# Patient Record
Sex: FEMALE | NOT HISPANIC OR LATINO | URBAN - METROPOLITAN AREA
[De-identification: names, ages, dates, MRNs, and addresses within clinical notes are randomized per-mention and may not be internally consistent; named-entity substitution may affect disease eponyms.]

---

## 2022-11-14 ENCOUNTER — APPOINTMENT (RX ONLY)
Dept: URBAN - METROPOLITAN AREA CLINIC 12 | Facility: CLINIC | Age: 57
Setting detail: DERMATOLOGY
End: 2022-11-14

## 2022-11-14 DIAGNOSIS — N62 HYPERTROPHY OF BREAST: ICD-10-CM

## 2022-11-14 PROCEDURE — ? COUNSELING - MACROMASTIA

## 2022-11-14 PROCEDURE — 99202 OFFICE O/P NEW SF 15 MIN: CPT

## 2022-11-14 PROCEDURE — ? PATIENT SPECIFIC COUNSELING

## 2022-11-14 NOTE — PROCEDURE: PATIENT SPECIFIC COUNSELING
Other (Free Text): Patient presents to clinic today for breast reduction and lift consultation. Patient voices that she has concerns with back and shoulder pain which causes numbness radiating down to her elbow. Patient voices no masses in her breast just calcifications that is not growing. Patient would like to be closer to C cup. \\n\\nDrDonya Monsivais examined patient and reports the following: \\n\\n- Outpatient procedure under general anesthesia \\n- Advised to take approximately 2 weeks off from heavy lifting \\n- Incision will be in the shape of a lollipop. One around areola and one vertical with anchor
Detail Level: Simple

## 2024-03-26 ENCOUNTER — APPOINTMENT (RX ONLY)
Dept: URBAN - METROPOLITAN AREA CLINIC 12 | Facility: CLINIC | Age: 59
Setting detail: DERMATOLOGY
End: 2024-03-26

## 2024-03-26 DIAGNOSIS — N62 HYPERTROPHY OF BREAST: ICD-10-CM

## 2024-03-26 PROCEDURE — ? PATIENT SPECIFIC COUNSELING

## 2024-03-26 PROCEDURE — ? OTHER (COSMETIC)

## 2024-03-26 PROCEDURE — ? COUNSELING - MACROMASTIA

## 2024-03-26 NOTE — PROCEDURE: PATIENT SPECIFIC COUNSELING
Other (Free Text): ***************Retalk from 2022**************\\n\\nPatient presents to clinic today for breast reduction and lift consultation. Patient voices that she has concerns with back and shoulder pain which causes numbness radiating down to her elbow. Patient states she has a lump in her breast that’s “not concerning” as well. Patient would like to be closer to C cup. \\n\\nDr. Loi examined patient and reports the following: \\n- patient is a great candidate for a breast reduction and lift\\n- Outpatient procedure under general anesthesia \\n- Advised to take approximately a week and a half off from heavy lifting and work\\n- Incision will be in the shape of a lollipop. One around areola and one vertical with anchor\\n\\nPatient indicated understanding. Jenelle will reach out.
Detail Level: Simple

## 2024-03-26 NOTE — PROCEDURE: OTHER (COSMETIC)
Price $ (Use Numbers Only, No Text Please.): 125
Detail Level: Zone
Sticky Other (Free Text): Consult fee

## 2024-05-29 ENCOUNTER — APPOINTMENT (RX ONLY)
Dept: URBAN - METROPOLITAN AREA CLINIC 12 | Facility: CLINIC | Age: 59
Setting detail: DERMATOLOGY
End: 2024-05-29

## 2024-05-29 DIAGNOSIS — N62 HYPERTROPHY OF BREAST: ICD-10-CM

## 2024-05-29 PROCEDURE — ? OTHER (COSMETIC)

## 2024-05-29 PROCEDURE — ? PRE-OP WORKLIST

## 2024-05-29 ASSESSMENT — LOCATION DETAILED DESCRIPTION DERM
LOCATION DETAILED: LEFT MEDIAL BREAST 6-7:00 REGION
LOCATION DETAILED: RIGHT MEDIAL BREAST 5-6:00 REGION

## 2024-05-29 ASSESSMENT — LOCATION SIMPLE DESCRIPTION DERM
LOCATION SIMPLE: RIGHT BREAST
LOCATION SIMPLE: LEFT BREAST

## 2024-05-29 ASSESSMENT — LOCATION ZONE DERM: LOCATION ZONE: TRUNK

## 2024-06-03 ENCOUNTER — RX ONLY (OUTPATIENT)
Age: 59
Setting detail: RX ONLY
End: 2024-06-03

## 2024-06-03 RX ORDER — ONDANSETRON 8 MG/1
1 TABLET TABLET, ORALLY DISINTEGRATING ORAL EVERY 12 HOURS
Qty: 8 | Refills: 0 | Status: ERX | COMMUNITY
Start: 2024-06-03

## 2024-06-12 ENCOUNTER — APPOINTMENT (RX ONLY)
Dept: URBAN - METROPOLITAN AREA CLINIC 12 | Facility: CLINIC | Age: 59
Setting detail: DERMATOLOGY
End: 2024-06-12

## 2024-06-12 DIAGNOSIS — Z48.89 ENCOUNTER FOR OTHER SPECIFIED SURGICAL AFTERCARE: ICD-10-CM

## 2024-06-12 PROCEDURE — ? DRAIN REMOVAL

## 2024-06-12 PROCEDURE — 99024 POSTOP FOLLOW-UP VISIT: CPT

## 2024-06-12 PROCEDURE — ? COUNSELING - POST-OP CHECK

## 2024-06-12 PROCEDURE — ? PATIENT SPECIFIC COUNSELING

## 2024-06-12 PROCEDURE — ? POST-OP CHECK

## 2024-06-12 ASSESSMENT — LOCATION ZONE DERM
LOCATION ZONE: ARM
LOCATION ZONE: TRUNK

## 2024-06-12 ASSESSMENT — LOCATION SIMPLE DESCRIPTION DERM
LOCATION SIMPLE: RIGHT BREAST
LOCATION SIMPLE: LEFT UPPER ARM
LOCATION SIMPLE: LEFT BREAST

## 2024-06-12 ASSESSMENT — LOCATION DETAILED DESCRIPTION DERM
LOCATION DETAILED: LEFT MEDIAL BREAST 7-8:00 REGION
LOCATION DETAILED: RIGHT PERIAREOLAR BREAST 10-11:00 REGION
LOCATION DETAILED: RIGHT MEDIAL BREAST 4-5:00 REGION
LOCATION DETAILED: LEFT ANTERIOR MEDIAL PROXIMAL UPPER ARM

## 2024-06-12 NOTE — PROCEDURE: PATIENT SPECIFIC COUNSELING
Detail Level: Simple
Other (Free Text): Patient presents s/p bilateral breast reduction and life on 6/5/24. Patient voiced she is doing well.\\n\\Flakito Monsivais examined the patient and voiced that she is looking good. Dr. Monsivais voiced that patient can shower normal but don’t submerge body in water. Dr. Monsivais advised patient to keep a bandage on the drain incisions, change it morning and night. Dr. Monsivais voiced patient can take Tylenol for pain, no Advil. Dr. Monsivais voiced patient should not put any creams on he incisions, take claritin for itching, and no activity for 1 week or lifting more than 5 lbs. Patient indicated understanding and to follow up in 2 weeks.

## 2024-06-12 NOTE — PROCEDURE: COUNSELING - POST-OP CHECK
Detail Level: Simple
Add Postop Global No-Charge Code (55688)?: yes
Influenza , 2018/10/12 14:38 , [place])

## 2024-06-25 ENCOUNTER — APPOINTMENT (RX ONLY)
Dept: URBAN - METROPOLITAN AREA CLINIC 12 | Facility: CLINIC | Age: 59
Setting detail: DERMATOLOGY
End: 2024-06-25

## 2024-06-25 DIAGNOSIS — Z48.89 ENCOUNTER FOR OTHER SPECIFIED SURGICAL AFTERCARE: ICD-10-CM

## 2024-06-25 PROCEDURE — ? COUNSELING - POST-OP CHECK

## 2024-06-25 PROCEDURE — ? PATIENT SPECIFIC COUNSELING

## 2024-06-25 PROCEDURE — 99024 POSTOP FOLLOW-UP VISIT: CPT

## 2024-06-25 PROCEDURE — ? POST-OP CHECK

## 2024-06-25 ASSESSMENT — LOCATION DETAILED DESCRIPTION DERM
LOCATION DETAILED: LEFT MEDIAL BREAST 7-8:00 REGION
LOCATION DETAILED: RIGHT MEDIAL BREAST 4-5:00 REGION

## 2024-06-25 ASSESSMENT — LOCATION SIMPLE DESCRIPTION DERM
LOCATION SIMPLE: RIGHT BREAST
LOCATION SIMPLE: LEFT BREAST

## 2024-06-25 ASSESSMENT — LOCATION ZONE DERM: LOCATION ZONE: TRUNK

## 2024-06-25 NOTE — PROCEDURE: PATIENT SPECIFIC COUNSELING
Detail Level: Simple
Other (Free Text): Patient presents s/p bilateral breast reduction and life on 6/5/24. Patient voiced she is doing well. Patient states she has started back to work. And is handling work well. Dr. Monsivais evaluated patient and voiced \\n\\n- breast look great. No signs of infection seroma or hematoma \\n- recommended arnica gel on her breast to help with bruising and swelling \\n- follow up in 2 weeks will start taping at that time \\n- patient should avoid submerging her breast in [place]

## 2024-07-22 ENCOUNTER — APPOINTMENT (RX ONLY)
Dept: URBAN - METROPOLITAN AREA CLINIC 12 | Facility: CLINIC | Age: 59
Setting detail: DERMATOLOGY
End: 2024-07-22

## 2024-07-22 DIAGNOSIS — Z48.89 ENCOUNTER FOR OTHER SPECIFIED SURGICAL AFTERCARE: ICD-10-CM

## 2024-07-22 PROCEDURE — 99024 POSTOP FOLLOW-UP VISIT: CPT

## 2024-07-22 PROCEDURE — ? PATIENT SPECIFIC COUNSELING

## 2024-07-22 PROCEDURE — ? COUNSELING - POST-OP CHECK

## 2024-07-22 PROCEDURE — ? POST-OP CHECK

## 2024-07-22 ASSESSMENT — LOCATION DETAILED DESCRIPTION DERM
LOCATION DETAILED: RIGHT MEDIAL BREAST 4-5:00 REGION
LOCATION DETAILED: LEFT MEDIAL BREAST 7-8:00 REGION

## 2024-07-22 ASSESSMENT — LOCATION SIMPLE DESCRIPTION DERM
LOCATION SIMPLE: LEFT BREAST
LOCATION SIMPLE: RIGHT BREAST

## 2024-07-22 ASSESSMENT — LOCATION ZONE DERM: LOCATION ZONE: TRUNK

## 2024-07-22 NOTE — PROCEDURE: PATIENT SPECIFIC COUNSELING
Detail Level: Simple
Other (Free Text): Patient presents s/p bilateral breast reduction and life on 6/5/24. Patient voiced she is doing well. Patient states she has a red scab next to the incision on her right breast. Patient states 5he scab was bigger, painful, and more red.\\n\\nDrDonya Monsivais evaluated patient and voiced:\\n\\n- breast look great\\n- the red area was most likely just a scab, nothing to be concerned about\\n- apply arnica gel to the inner sides of the breast to help with swelling\\n- being taping the incisions with scar fading tape and change it when it gets dirty\\n- sleep as normal\\n- wait one more month before wearing wired bras\\n- very good candidate for jawline fillers, referred to [facility]\\nRTC in 2 months

## 2024-09-16 ENCOUNTER — APPOINTMENT (RX ONLY)
Dept: URBAN - METROPOLITAN AREA CLINIC 12 | Facility: CLINIC | Age: 59
Setting detail: DERMATOLOGY
End: 2024-09-16

## 2024-09-16 DIAGNOSIS — Z48.89 ENCOUNTER FOR OTHER SPECIFIED SURGICAL AFTERCARE: ICD-10-CM

## 2024-09-16 PROCEDURE — ? COUNSELING - POST-OP CHECK

## 2024-09-16 PROCEDURE — 99024 POSTOP FOLLOW-UP VISIT: CPT

## 2024-09-16 PROCEDURE — ? POST-OP CHECK

## 2024-09-16 PROCEDURE — ? PATIENT SPECIFIC COUNSELING

## 2024-09-16 ASSESSMENT — LOCATION SIMPLE DESCRIPTION DERM
LOCATION SIMPLE: LEFT BREAST
LOCATION SIMPLE: RIGHT BREAST

## 2024-09-16 ASSESSMENT — LOCATION ZONE DERM: LOCATION ZONE: TRUNK

## 2024-09-16 NOTE — PROCEDURE: PATIENT SPECIFIC COUNSELING
Detail Level: Simple
Other (Free Text): Patient presents s/p bilateral breast reduction and life on 6/5/24. Patient voiced she is doing well, patient states her breast seems  to be sagging a little bit breast are not as perky and firm as before, other wise patient is still happy.\\n\\n- breast look great, scar are fading well. \\n- reassured that a little settling in the breast is normal, \\n- patient may resume normal workouts from this point \\n- continue applying the scar fading cream \\n\\n\\n- discussed liposuction with [employer] to help contour her neck \\n- recommended fillers for her jawline

## 2025-05-09 ENCOUNTER — APPOINTMENT (OUTPATIENT)
Dept: URBAN - METROPOLITAN AREA CLINIC 12 | Facility: CLINIC | Age: 60
Setting detail: DERMATOLOGY
End: 2025-05-09

## 2025-05-09 DIAGNOSIS — Z41.9 ENCOUNTER FOR PROCEDURE FOR PURPOSES OTHER THAN REMEDYING HEALTH STATE, UNSPECIFIED: ICD-10-CM

## 2025-05-09 PROCEDURE — ? JUVEDERM VOLUX XC INJECTION

## 2025-05-09 PROCEDURE — ? JUVEDERM VOLUMA XC INJECTION

## 2025-05-28 ENCOUNTER — APPOINTMENT (OUTPATIENT)
Dept: URBAN - METROPOLITAN AREA CLINIC 12 | Facility: CLINIC | Age: 60
Setting detail: DERMATOLOGY
End: 2025-05-28

## 2025-05-28 DIAGNOSIS — Z428 OTHER AFTERCARE INVOLVING THE USE OF PLASTIC SURGERY: ICD-10-CM

## 2025-05-28 PROCEDURE — ? ADDITIONAL NOTES

## 2025-05-28 PROCEDURE — ? OTHER (COSMETIC)

## 2025-05-28 NOTE — PROCEDURE: OTHER (COSMETIC)
Price $ (Use Numbers Only, No Text Please.): 0
Detail Level: Zone
Sticky Other (Free Text): Follow up no charge

## 2025-05-28 NOTE — PROCEDURE: ADDITIONAL NOTES
Render Risk Assessment In Note?: no
Additional Notes: Recommended: \\n1 cc Ultra plus for marionette lines - $750\\nBotox 10 u for DAOs - $140 Prebook promo